# Patient Record
Sex: MALE | Race: WHITE | NOT HISPANIC OR LATINO | Employment: FULL TIME | URBAN - METROPOLITAN AREA
[De-identification: names, ages, dates, MRNs, and addresses within clinical notes are randomized per-mention and may not be internally consistent; named-entity substitution may affect disease eponyms.]

---

## 2024-03-28 ENCOUNTER — OFFICE VISIT (OUTPATIENT)
Dept: PRIMARY CARE | Facility: CLINIC | Age: 29
End: 2024-03-28
Payer: MEDICARE

## 2024-03-28 VITALS
OXYGEN SATURATION: 94 % | BODY MASS INDEX: 36.45 KG/M2 | TEMPERATURE: 97.7 F | DIASTOLIC BLOOD PRESSURE: 90 MMHG | WEIGHT: 315 LBS | HEART RATE: 91 BPM | HEIGHT: 78 IN | SYSTOLIC BLOOD PRESSURE: 148 MMHG

## 2024-03-28 DIAGNOSIS — T16.2XXA FOREIGN BODY OF LEFT EAR, INITIAL ENCOUNTER: Primary | ICD-10-CM

## 2024-03-28 PROCEDURE — 99213 OFFICE O/P EST LOW 20 MIN: CPT | Performed by: NURSE PRACTITIONER

## 2024-03-28 PROCEDURE — 1036F TOBACCO NON-USER: CPT | Performed by: NURSE PRACTITIONER

## 2024-03-28 PROCEDURE — 69200 CLEAR OUTER EAR CANAL: CPT | Performed by: NURSE PRACTITIONER

## 2024-03-28 RX ORDER — LISINOPRIL 40 MG/1
40 TABLET ORAL DAILY
COMMUNITY
End: 2024-04-09 | Stop reason: DRUGHIGH

## 2024-03-28 ASSESSMENT — ENCOUNTER SYMPTOMS
FATIGUE: 0
DIAPHORESIS: 0
MUSCULOSKELETAL NEGATIVE: 1
COUGH: 0
SHORTNESS OF BREATH: 0
DIZZINESS: 0
NAUSEA: 0
FEVER: 0
VOMITING: 0
WHEEZING: 0
PALPITATIONS: 0
DIARRHEA: 0
NUMBNESS: 0
WEAKNESS: 0
LIGHT-HEADEDNESS: 0
ALLERGIC/IMMUNOLOGIC NEGATIVE: 1
HEMATOLOGIC/LYMPHATIC NEGATIVE: 1
CONSTIPATION: 0
PSYCHIATRIC NEGATIVE: 1

## 2024-03-28 ASSESSMENT — LIFESTYLE VARIABLES
SKIP TO QUESTIONS 9-10: 0
HOW MANY STANDARD DRINKS CONTAINING ALCOHOL DO YOU HAVE ON A TYPICAL DAY: 5 OR 6
HOW OFTEN DO YOU HAVE SIX OR MORE DRINKS ON ONE OCCASION: WEEKLY
HOW OFTEN DO YOU HAVE A DRINK CONTAINING ALCOHOL: 2-3 TIMES A WEEK
AUDIT-C TOTAL SCORE: 8

## 2024-03-28 ASSESSMENT — PAIN SCALES - GENERAL: PAINLEVEL: 0-NO PAIN

## 2024-03-28 ASSESSMENT — PATIENT HEALTH QUESTIONNAIRE - PHQ9
SUM OF ALL RESPONSES TO PHQ9 QUESTIONS 1 AND 2: 0
1. LITTLE INTEREST OR PLEASURE IN DOING THINGS: NOT AT ALL
2. FEELING DOWN, DEPRESSED OR HOPELESS: NOT AT ALL

## 2024-03-28 NOTE — PROGRESS NOTES
"History Of Present Illness  Maxx Mohan is a 28 y.o. male presenting for \"Foreign Body in Ear (Qtip stuck in ear).\"    HPI patient is a 28-year-old male here today for a Q-tip head stuck in ear patient was cleaning his ear when the tip of the Q-tip fell off inside and he was unable to get it out    Past Medical History  There are no problems to display for this patient.       Medications  Current Outpatient Medications   Medication Instructions    lisinopril 40 mg, oral, Daily        Surgical History  He has a past surgical history that includes Other surgical history (08/08/2014).     Social History  He reports that he has never smoked. He has never used smokeless tobacco. He reports current alcohol use. He reports that he does not use drugs.    Family History  Family History   Problem Relation Name Age of Onset    Breast cancer Mother      Obesity Father      Hypertension Father      No Known Problems Brother      No Known Problems Brother          Allergies  Pollen extracts    ROS  Review of Systems   Constitutional:  Negative for diaphoresis, fatigue and fever.   HENT:          Foreign body in left ear   Respiratory:  Negative for cough, shortness of breath and wheezing.    Cardiovascular:  Negative for chest pain and palpitations.   Gastrointestinal:  Negative for constipation, diarrhea, nausea and vomiting.   Genitourinary: Negative.    Musculoskeletal: Negative.    Skin: Negative.    Allergic/Immunologic: Negative.    Neurological:  Negative for dizziness, weakness, light-headedness and numbness.   Hematological: Negative.    Psychiatric/Behavioral: Negative.          Last Recorded Vitals  /90   Pulse 91   Temp 36.5 °C (97.7 °F)   Wt (!) 204 kg (449 lb 8 oz)   SpO2 94%     Physical Exam  Vitals and nursing note reviewed.   Constitutional:       Appearance: Normal appearance.   HENT:      Ears:      Comments: Tip of Q-tip in ear canal up against TM removed with tweezers postprocedure patient's TM " flat and gray, patient tolerated well  Neck:      Thyroid: No thyromegaly.   Cardiovascular:      Rate and Rhythm: Normal rate and regular rhythm.      Pulses: Normal pulses.      Heart sounds: Normal heart sounds, S1 normal and S2 normal.   Pulmonary:      Effort: Pulmonary effort is normal.      Breath sounds: Normal breath sounds.   Musculoskeletal:         General: Normal range of motion.      Cervical back: Normal range of motion.   Lymphadenopathy:      Cervical: No cervical adenopathy.   Skin:     General: Skin is warm.         Relevant Results      Assessment/Plan   Maxx was seen today for foreign body in ear.  Diagnoses and all orders for this visit:  Foreign body of left ear, initial encounter (Primary)          COUNSELING      Medication education:              Education:  The patient is counseled regarding potential side-effects of any and all new medications             Understanding:  Patient expressed understanding             Adherence:  No barriers to adherence identified        Vicki Rodriguez, APRN-CNP

## 2024-04-08 PROBLEM — Z85.818 HISTORY OF MALIGNANT NEOPLASM OF PAROTID GLAND: Status: ACTIVE | Noted: 2024-04-08

## 2024-04-08 PROBLEM — F32.A DEPRESSION: Status: ACTIVE | Noted: 2024-04-08

## 2024-04-08 PROBLEM — I10 ESSENTIAL HYPERTENSION: Status: ACTIVE | Noted: 2024-04-08

## 2024-04-08 NOTE — PROGRESS NOTES
Outpatient Visit Note    Chief Complaint   Patient presents with    Annual Exam       HPI:  Maxx Mohan is a 28 y.o. 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  He was last seen in the office on 3/28/2024 by Luiza Rodriguez secondary to foreign body in left ear.  Prior to this he had been seen on 7/27/2022 for annual well exam.    Last blood work was completed on 7/27/2022 including CMP and lipid panel which was remarkable for mildly elevated ALT.    Well Exam:  Overall, they describes their health as fair with no reports of recent illness or hospitalization. They states that their diet is stable with efforts made to adjust diet. In regards to physical activity, he is hoping to become more physically active with better weather and spring/summer. Denies any significant sleep complaints. Denies issues of chest pain, shortness of breath, headaches, vision/hearing changes, abdominal pain, vomiting, diarrhea, melena, hematochezia, constipation or urinary symptoms.    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 respectively. Stated that his oncologist moved to [place] and he has not been routinely seen by a cancer specialist for approximately 6 years to which referrals to oncology and ENT have been given at prior encounter.  He has not successfully linking up with new specialist to date, particularly as he did not have insurance coverage for some time    Preventative Health Maintenance:  In regards to preventative health maintenance, last Tdap received in August 2013. Flu shot not typically received. COVID-19 vaccine series not pursued to-date.    Hypertension:  He has not been out of his lisinopril regimen for several months, was unable to come in secondary to insurance issues.  Denies  symptomatic concerns such as chest pain, shortness of breath, lightheadedness, dizziness or vision changes.  Does need to check blood pressure occasionally to which he typically runs on the high end of normal usually in the 130s-140s/80s-90s.    Patient continues to have bilateral lower extremity rash.  Rash is slightly itchy.  Has tried steroid creams in the past which offered some improvement but no resolution.    Additionally notes to have jammed his right thumb several months ago.  Does continue to have slight range of motion restriction.      Current Medications  Current Outpatient Medications   Medication Instructions    clotrimazole-betamethasone (Lotrisone) cream 1 Application, Topical, 2 times daily    lisinopril 20 mg, oral, Daily        Allergies  Allergies   Allergen Reactions    Pollen Extracts Itching     nasal congestion, sneezing        Immunizations  Immunization History   Administered Date(s) Administered    DTP 1995, 1995, 01/03/1996, 12/18/1998, 06/25/2006    DTaP, Unspecified 06/19/2000    Flu vaccine (IIV4), preservative free *Check age/dose* 10/25/2019    HPV, Quadrivalent 01/23/2014    Hepatitis A vaccine, pediatric/adolescent (HAVRIX, VAQTA) 08/01/2013    Hepatitis B vaccine, pediatric/adolescent (RECOMBIVAX, ENGERIX) 1995, 1995, 03/13/1996    HiB, unspecified 1995, 1995, 01/03/1996, 10/02/1996    Influenza, injectable, quadrivalent 10/09/2020    MMR vaccine, subcutaneous (MMR II) 10/02/1996, 06/22/1998    Meningococcal MCV4P 11/01/2007, 08/01/2013    Polio, Unspecified 1995, 1995, 01/03/1996    Poliovirus vaccine, subcutaneous (IPOL) 06/19/2000    Tdap vaccine, age 7 year and older (BOOSTRIX, ADACEL) 06/25/2006, 08/01/2013    Varicella vaccine, subcutaneous (VARIVAX) 06/19/1996        Past Medical History:   Diagnosis Date    Hypertension     Other diseases of salivary glands     Parotid gland pain      Past Surgical History:   Procedure  Laterality Date    OTHER SURGICAL HISTORY  08/08/2014    Biopsy Parotid     Family History   Problem Relation Name Age of Onset    Breast cancer Mother      Obesity Father      Hypertension Father      No Known Problems Brother      No Known Problems Brother       Social History     Tobacco Use    Smoking status: Never    Smokeless tobacco: Never   Vaping Use    Vaping Use: Some days    Substances: Nicotine    Devices: Disposable   Substance Use Topics    Alcohol use: Yes     Comment: socially    Drug use: Never       ROS  All pertinent positive symptoms are included in the history of present illness.  All other systems have been reviewed and are negative and noncontributory to this patient's current ailments.      VITAL SIGNS  Vitals:    04/09/24 0935   BP: 136/86   Pulse: 90   Temp: 35.9 °C (96.7 °F)   SpO2: 94%       PHYSICAL EXAM  GENERAL APPEARANCE: alert and oriented, Pleasant and cooperative, No Acute Distress.   HEENT: EOMI, PERRLA, TMs intact and flat bilaterally, patent nares, normal oropharynx, MMM  NECK: no lymphadenopathy, no thyromegaly.   HEART: RRR, normal S1S2, no murmurs, click or rubs.   LUNGS: clear to auscultation bilaterally, no wheezes/rhonchi/rales.   ABDOMEN: soft, non-tender, no organomegaly, no masses palpated, no guarding or rigidity.   EXTREMITIES: no edema, normal ROM  SKIN: normal, no rash, unremarkable.   NEUROLOGIC EXAM: non-focal exam.   MUSCULOSKELETAL: no gross abnormalities.   PSYCH: affect is normal, eye contact is good.     Assessment/Plan   Problem List Items Addressed This Visit             ICD-10-CM    Essential hypertension I10     - Blood pressure mildly elevated in office today though we have been without our lisinopril  -Will plan to reinitiate medication regimen utilizing lisinopril 20 mg with plans to monitor blood pressure going forward; ambulatory blood pressure monitoring encouraged  -Continue to focus on healthy, low-fat diet with moderation of  salt/caffeine/alcohol         Relevant Medications    lisinopril 20 mg tablet    Other Relevant Orders    Comprehensive metabolic panel    Lipid panel    History of malignant neoplasm of parotid gland Z85.818     - Will continue with original plans to set up/establish care with new oncologist/ENT to have established relationship secondary to a history; referral given         Relevant Orders    Referral to ENT    Referral to Hematology and Oncology    Dermatitis L30.9     - Persisting rash does have concerning signs for possible fungal infection to which we will try combination antifungal/steroid cream  -If persist, can coordinate formal dermatology evaluation         Relevant Medications    clotrimazole-betamethasone (Lotrisone) cream     Other Visit Diagnoses         Codes    Routine adult health maintenance    -  Primary Z00.00    Relevant Orders    Tdap vaccine, age 7 years and older  (BOOSTRIX)    Elevated liver enzymes     R74.8    Relevant Orders    Comprehensive metabolic panel    Lipid panel    Need for hepatitis C screening test     Z11.59    Relevant Orders    Hepatitis C antibody    Encounter for immunization     Z23    Relevant Orders    Tdap vaccine, age 7 years and older  (BOOSTRIX)            Additional Visit Plans:  - Complete history and physical examination was performed    GENERAL RECOMMENDATIONS:  - Complete review of history of physical exam completed today  - A healthy diet to maintain a normal BMI (under 25) to reduce heart disease, risk for diabetes encouraged.  - Exercising 150 minutes per week and eating healthy to reduce heart disease.  - Blood pressure screen completed.    BLOOD TESTING:  - Orders for fasting routine blood work given today, to be completed at your earliest convenience  - Will contact you with the blood work results once received and reviewed.    General Recommendations include:  - Cholesterol and diabetes screen if risk factors (overweight, high blood pressure).  -  Sexually transmitted infections if risk factors.  - Hepatitis C virus screen for all adults -ordered for blood work today    VACCINATIONS RECOMMENDATIONS:  - Flu shot annually - advocated seasonally  - Tetanus booster every 10 years - advocated  - Pneumonia vaccination starting at 65 years old (or earlier if risk factors - smoker, diabetic, heart or lung conditions) -not due yet  - Shingles vaccine for those 50 years or older - check with your insurance for SHINGRIX coverage and get it at your local pharmacy -not due yet  -COVID-19 vaccine series advocated    SCREENINGS RECOMMENDATIONS:  -Colon cancer screening (with colonoscopy or Cologuard) for men and women starting at age 45 until 74 years old - not due yet    Counseling:       Medication education:         Education:  The patient is counseled regarding potential side-effects of all new medications        Understanding:  Patient expressed understanding        Adherence:  No barriers to adherence identified

## 2024-04-09 ENCOUNTER — OFFICE VISIT (OUTPATIENT)
Dept: PRIMARY CARE | Facility: CLINIC | Age: 29
End: 2024-04-09
Payer: MEDICARE

## 2024-04-09 ENCOUNTER — LAB (OUTPATIENT)
Dept: LAB | Facility: LAB | Age: 29
End: 2024-04-09
Payer: MEDICARE

## 2024-04-09 VITALS
HEIGHT: 78 IN | WEIGHT: 315 LBS | HEART RATE: 90 BPM | SYSTOLIC BLOOD PRESSURE: 136 MMHG | DIASTOLIC BLOOD PRESSURE: 86 MMHG | BODY MASS INDEX: 36.45 KG/M2 | OXYGEN SATURATION: 94 % | TEMPERATURE: 96.7 F

## 2024-04-09 DIAGNOSIS — I10 ESSENTIAL HYPERTENSION: ICD-10-CM

## 2024-04-09 DIAGNOSIS — Z11.59 NEED FOR HEPATITIS C SCREENING TEST: ICD-10-CM

## 2024-04-09 DIAGNOSIS — R74.8 ELEVATED LIVER ENZYMES: ICD-10-CM

## 2024-04-09 DIAGNOSIS — Z85.818 HISTORY OF MALIGNANT NEOPLASM OF PAROTID GLAND: ICD-10-CM

## 2024-04-09 DIAGNOSIS — Z00.00 ROUTINE ADULT HEALTH MAINTENANCE: Primary | ICD-10-CM

## 2024-04-09 DIAGNOSIS — Z23 ENCOUNTER FOR IMMUNIZATION: ICD-10-CM

## 2024-04-09 DIAGNOSIS — L30.9 DERMATITIS: ICD-10-CM

## 2024-04-09 LAB
ALBUMIN SERPL-MCNC: 4.6 G/DL (ref 3.5–5)
ALP BLD-CCNC: 60 U/L (ref 35–125)
ALT SERPL-CCNC: 52 U/L (ref 5–40)
ANION GAP SERPL CALC-SCNC: 13 MMOL/L
AST SERPL-CCNC: 26 U/L (ref 5–40)
BILIRUB SERPL-MCNC: 0.5 MG/DL (ref 0.1–1.2)
BUN SERPL-MCNC: 12 MG/DL (ref 8–25)
CALCIUM SERPL-MCNC: 9.4 MG/DL (ref 8.5–10.4)
CHLORIDE SERPL-SCNC: 102 MMOL/L (ref 97–107)
CHOLEST SERPL-MCNC: 165 MG/DL (ref 133–200)
CHOLEST/HDLC SERPL: 4.1 {RATIO}
CO2 SERPL-SCNC: 25 MMOL/L (ref 24–31)
CREAT SERPL-MCNC: 0.9 MG/DL (ref 0.4–1.6)
EGFRCR SERPLBLD CKD-EPI 2021: >90 ML/MIN/1.73M*2
GLUCOSE SERPL-MCNC: 105 MG/DL (ref 65–99)
HCV AB SER QL: NONREACTIVE
HDLC SERPL-MCNC: 40 MG/DL
LDLC SERPL CALC-MCNC: 103 MG/DL (ref 65–130)
POTASSIUM SERPL-SCNC: 4.6 MMOL/L (ref 3.4–5.1)
PROT SERPL-MCNC: 7.6 G/DL (ref 5.9–7.9)
SODIUM SERPL-SCNC: 140 MMOL/L (ref 133–145)
TRIGL SERPL-MCNC: 108 MG/DL (ref 40–150)

## 2024-04-09 PROCEDURE — 3079F DIAST BP 80-89 MM HG: CPT | Performed by: FAMILY MEDICINE

## 2024-04-09 PROCEDURE — 1036F TOBACCO NON-USER: CPT | Performed by: FAMILY MEDICINE

## 2024-04-09 PROCEDURE — 90715 TDAP VACCINE 7 YRS/> IM: CPT | Performed by: FAMILY MEDICINE

## 2024-04-09 PROCEDURE — 80053 COMPREHEN METABOLIC PANEL: CPT

## 2024-04-09 PROCEDURE — 86803 HEPATITIS C AB TEST: CPT

## 2024-04-09 PROCEDURE — 36415 COLL VENOUS BLD VENIPUNCTURE: CPT

## 2024-04-09 PROCEDURE — 3075F SYST BP GE 130 - 139MM HG: CPT | Performed by: FAMILY MEDICINE

## 2024-04-09 PROCEDURE — 99395 PREV VISIT EST AGE 18-39: CPT | Performed by: FAMILY MEDICINE

## 2024-04-09 PROCEDURE — 80061 LIPID PANEL: CPT

## 2024-04-09 RX ORDER — LISINOPRIL 20 MG/1
20 TABLET ORAL DAILY
Qty: 90 TABLET | Refills: 3 | Status: SHIPPED | OUTPATIENT
Start: 2024-04-09 | End: 2025-04-09

## 2024-04-09 RX ORDER — CLOTRIMAZOLE AND BETAMETHASONE DIPROPIONATE 10; .64 MG/G; MG/G
1 CREAM TOPICAL 2 TIMES DAILY
Qty: 45 G | Refills: 0 | Status: SHIPPED | OUTPATIENT
Start: 2024-04-09 | End: 2024-05-07

## 2024-04-09 ASSESSMENT — PATIENT HEALTH QUESTIONNAIRE - PHQ9
SUM OF ALL RESPONSES TO PHQ9 QUESTIONS 1 AND 2: 0
2. FEELING DOWN, DEPRESSED OR HOPELESS: NOT AT ALL
1. LITTLE INTEREST OR PLEASURE IN DOING THINGS: NOT AT ALL

## 2024-04-09 ASSESSMENT — PAIN SCALES - GENERAL: PAINLEVEL: 0-NO PAIN

## 2024-04-09 NOTE — ASSESSMENT & PLAN NOTE
- Blood pressure mildly elevated in office today though we have been without our lisinopril  -Will plan to reinitiate medication regimen utilizing lisinopril 20 mg with plans to monitor blood pressure going forward; ambulatory blood pressure monitoring encouraged  -Continue to focus on healthy, low-fat diet with moderation of salt/caffeine/alcohol

## 2024-04-09 NOTE — ASSESSMENT & PLAN NOTE
- Will continue with original plans to set up/establish care with new oncologist/ENT to have established relationship secondary to a history; referral given

## 2024-04-09 NOTE — PATIENT INSTRUCTIONS
Problem List Items Addressed This Visit             ICD-10-CM    Essential hypertension I10     - Blood pressure mildly elevated in office today though we have been without our lisinopril  -Will plan to reinitiate medication regimen utilizing lisinopril 20 mg with plans to monitor blood pressure going forward; ambulatory blood pressure monitoring encouraged  -Continue to focus on healthy, low-fat diet with moderation of salt/caffeine/alcohol         Relevant Medications    lisinopril 20 mg tablet    Other Relevant Orders    Comprehensive metabolic panel    Lipid panel    History of malignant neoplasm of parotid gland Z85.818     - Will continue with original plans to set up/establish care with new oncologist/ENT to have established relationship secondary to a history; referral given         Relevant Orders    Referral to ENT    Referral to Hematology and Oncology    Dermatitis L30.9     - Persisting rash does have concerning signs for possible fungal infection to which we will try combination antifungal/steroid cream  -If persist, can coordinate formal dermatology evaluation         Relevant Medications    clotrimazole-betamethasone (Lotrisone) cream     Other Visit Diagnoses         Codes    Routine adult health maintenance    -  Primary Z00.00    Relevant Orders    Tdap vaccine, age 7 years and older  (BOOSTRIX)    Elevated liver enzymes     R74.8    Relevant Orders    Comprehensive metabolic panel    Lipid panel    Need for hepatitis C screening test     Z11.59    Relevant Orders    Hepatitis C antibody    Encounter for immunization     Z23    Relevant Orders    Tdap vaccine, age 7 years and older  (BOOSTRIX)            Additional Visit Plans:  - Complete history and physical examination was performed    GENERAL RECOMMENDATIONS:  - Complete review of history of physical exam completed today  - A healthy diet to maintain a normal BMI (under 25) to reduce heart disease, risk for diabetes encouraged.  - Exercising  150 minutes per week and eating healthy to reduce heart disease.  - Blood pressure screen completed.    BLOOD TESTING:  - Orders for fasting routine blood work given today, to be completed at your earliest convenience  - Will contact you with the blood work results once received and reviewed.    General Recommendations include:  - Cholesterol and diabetes screen if risk factors (overweight, high blood pressure).  - Sexually transmitted infections if risk factors.  - Hepatitis C virus screen for all adults -ordered for blood work today    VACCINATIONS RECOMMENDATIONS:  - Flu shot annually - advocated seasonally  - Tetanus booster every 10 years - advocated  - Pneumonia vaccination starting at 65 years old (or earlier if risk factors - smoker, diabetic, heart or lung conditions) -not due yet  - Shingles vaccine for those 50 years or older - check with your insurance for SHINGRIX coverage and get it at your local pharmacy -not due yet  -COVID-19 vaccine series advocated    SCREENINGS RECOMMENDATIONS:  -Colon cancer screening (with colonoscopy or Cologuard) for men and women starting at age 45 until 74 years old - not due yet    Counseling:       Medication education:         Education:  The patient is counseled regarding potential side-effects of all new medications        Understanding:  Patient expressed understanding        Adherence:  No barriers to adherence identified

## 2024-04-09 NOTE — ASSESSMENT & PLAN NOTE
- Persisting rash does have concerning signs for possible fungal infection to which we will try combination antifungal/steroid cream  -If persist, can coordinate formal dermatology evaluation